# Patient Record
Sex: FEMALE | Race: BLACK OR AFRICAN AMERICAN | URBAN - METROPOLITAN AREA
[De-identification: names, ages, dates, MRNs, and addresses within clinical notes are randomized per-mention and may not be internally consistent; named-entity substitution may affect disease eponyms.]

---

## 2017-10-31 ENCOUNTER — FOLLOW UP ESTABLISHED (OUTPATIENT)
Dept: URBAN - METROPOLITAN AREA CLINIC 30 | Facility: CLINIC | Age: 67
End: 2017-10-31
Payer: MEDICARE

## 2017-10-31 DIAGNOSIS — H02.834 DERMATOCHALASIS OF LEFT UPPER LID: ICD-10-CM

## 2017-10-31 DIAGNOSIS — H02.831 DERMATOCHALASIS OF RIGHT UPPER LID: ICD-10-CM

## 2017-10-31 PROCEDURE — 92014 COMPRE OPH EXAM EST PT 1/>: CPT | Performed by: OPTOMETRIST

## 2017-10-31 PROCEDURE — 92250 FUNDUS PHOTOGRAPHY W/I&R: CPT | Performed by: OPTOMETRIST

## 2017-10-31 PROCEDURE — 92015 DETERMINE REFRACTIVE STATE: CPT | Performed by: OPTOMETRIST

## 2017-10-31 ASSESSMENT — VISUAL ACUITY
OD: 20/30
OS: 20/30

## 2017-10-31 ASSESSMENT — INTRAOCULAR PRESSURE
OD: 16
OS: 16

## 2018-10-30 ENCOUNTER — FOLLOW UP ESTABLISHED (OUTPATIENT)
Dept: URBAN - METROPOLITAN AREA CLINIC 30 | Facility: CLINIC | Age: 68
End: 2018-10-30
Payer: MEDICARE

## 2018-10-30 PROCEDURE — 92134 CPTRZ OPH DX IMG PST SGM RTA: CPT | Performed by: OPTOMETRIST

## 2018-10-30 PROCEDURE — 92014 COMPRE OPH EXAM EST PT 1/>: CPT | Performed by: OPTOMETRIST

## 2018-10-30 ASSESSMENT — INTRAOCULAR PRESSURE
OS: 13
OD: 14

## 2018-10-30 ASSESSMENT — VISUAL ACUITY
OS: 20/40
OD: 20/50

## 2018-10-30 ASSESSMENT — KERATOMETRY
OS: 44.79
OD: 44.83

## 2020-01-07 ENCOUNTER — FOLLOW UP ESTABLISHED (OUTPATIENT)
Dept: URBAN - METROPOLITAN AREA CLINIC 30 | Facility: CLINIC | Age: 70
End: 2020-01-07
Payer: MEDICARE

## 2020-01-07 PROCEDURE — 92014 COMPRE OPH EXAM EST PT 1/>: CPT | Performed by: OPTOMETRIST

## 2020-01-07 PROCEDURE — 92134 CPTRZ OPH DX IMG PST SGM RTA: CPT | Performed by: OPTOMETRIST

## 2020-01-07 PROCEDURE — 92015 DETERMINE REFRACTIVE STATE: CPT | Performed by: OPTOMETRIST

## 2020-01-07 PROCEDURE — 2022F DILAT RTA XM EVC RTNOPTHY: CPT | Performed by: OPTOMETRIST

## 2020-01-07 ASSESSMENT — KERATOMETRY
OD: 44.67
OS: 44.65

## 2020-01-07 ASSESSMENT — INTRAOCULAR PRESSURE
OD: 14
OS: 14

## 2020-01-07 ASSESSMENT — VISUAL ACUITY
OD: 20/40
OS: 20/30

## 2021-05-17 ENCOUNTER — OFFICE VISIT (OUTPATIENT)
Dept: URBAN - METROPOLITAN AREA CLINIC 30 | Facility: CLINIC | Age: 71
End: 2021-05-17
Payer: OTHER MISCELLANEOUS

## 2021-05-17 DIAGNOSIS — H04.123 DRY EYE SYNDROME OF BILATERAL LACRIMAL GLANDS: ICD-10-CM

## 2021-05-17 DIAGNOSIS — E11.9 TYPE 2 DIABETES MELLITUS WITHOUT COMPLICATIONS: Primary | ICD-10-CM

## 2021-05-17 DIAGNOSIS — Z79.84 LONG TERM (CURRENT) USE OF ORAL HYPOGLYCEMIC DRUGS: ICD-10-CM

## 2021-05-17 PROCEDURE — 92134 CPTRZ OPH DX IMG PST SGM RTA: CPT | Performed by: OPTOMETRIST

## 2021-05-17 PROCEDURE — 92014 COMPRE OPH EXAM EST PT 1/>: CPT | Performed by: OPTOMETRIST

## 2021-05-17 ASSESSMENT — KERATOMETRY
OS: 44.76
OD: 45.09

## 2021-05-17 ASSESSMENT — VISUAL ACUITY
OS: 20/30
OD: 20/30

## 2021-05-17 ASSESSMENT — INTRAOCULAR PRESSURE
OS: 13
OD: 18

## 2021-05-17 NOTE — IMPRESSION/PLAN
Impression: Tear film insufficiency of bilateral lacrimal glands Plan: Mild dryness noted today, improved from last visit. Using ATs prn. Increase use prn.

## 2021-05-17 NOTE — IMPRESSION/PLAN
Impression: Type 2 diabetes mellitus without complications Plan: No retinopathy on exam today. Patient educated on importance of BG control. Monitor annually.

## 2021-05-17 NOTE — IMPRESSION/PLAN
Impression: Combined forms of age-related cataract, bilateral Plan: Appear stable on exam today. Continue to monitor. New spec Rx today.

## 2021-08-31 ENCOUNTER — OFFICE VISIT (OUTPATIENT)
Dept: URBAN - METROPOLITAN AREA CLINIC 30 | Facility: CLINIC | Age: 71
End: 2021-08-31
Payer: OTHER MISCELLANEOUS

## 2021-08-31 PROCEDURE — 99213 OFFICE O/P EST LOW 20 MIN: CPT | Performed by: OPTOMETRIST

## 2021-08-31 ASSESSMENT — INTRAOCULAR PRESSURE
OS: 15
OD: 16

## 2021-08-31 NOTE — IMPRESSION/PLAN
Impression: Tear film insufficiency of bilateral lacrimal glands +MGD Plan: Patient notes tearing. Has been using Visine. d/c Visine and start ATs TID-QID OU. Rec WCP regularly as well-gave dry eye handout today. 
Call if NI.

## 2022-03-08 ENCOUNTER — OFFICE VISIT (OUTPATIENT)
Dept: URBAN - METROPOLITAN AREA CLINIC 40 | Facility: CLINIC | Age: 72
End: 2022-03-08
Payer: OTHER MISCELLANEOUS

## 2022-03-08 DIAGNOSIS — H43.813 VITREOUS DEGENERATION, BILATERAL: Primary | ICD-10-CM

## 2022-03-08 DIAGNOSIS — H25.813 COMBINED FORMS OF AGE-RELATED CATARACT, BILATERAL: ICD-10-CM

## 2022-03-08 PROCEDURE — 99204 OFFICE O/P NEW MOD 45 MIN: CPT | Performed by: OPTOMETRIST

## 2022-03-08 ASSESSMENT — KERATOMETRY
OD: 44.88
OS: 45.00

## 2022-03-08 ASSESSMENT — INTRAOCULAR PRESSURE
OS: 10
OD: 18

## 2022-03-08 NOTE — IMPRESSION/PLAN
Impression: Type 2 diabetes mellitus w/o complication: S55.0. Plan: Diabetes type II: no background retinopathy, no signs of neovascularization noted. Discussed ocular and systemic benefits of blood sugar control.

## 2022-05-10 ENCOUNTER — OFFICE VISIT (OUTPATIENT)
Dept: URBAN - METROPOLITAN AREA CLINIC 40 | Facility: CLINIC | Age: 72
End: 2022-05-10
Payer: OTHER MISCELLANEOUS

## 2022-05-10 DIAGNOSIS — H25.813 COMBINED FORMS OF AGE-RELATED CATARACT, BILATERAL: Primary | ICD-10-CM

## 2022-05-10 DIAGNOSIS — E11.9 TYPE 2 DIABETES MELLITUS W/O COMPLICATION: ICD-10-CM

## 2022-05-10 PROCEDURE — 99204 OFFICE O/P NEW MOD 45 MIN: CPT | Performed by: OPHTHALMOLOGY

## 2022-05-10 ASSESSMENT — VISUAL ACUITY
OD: 20/30
OS: 20/30

## 2022-05-10 ASSESSMENT — INTRAOCULAR PRESSURE
OD: 16
OS: 14

## 2022-05-10 NOTE — IMPRESSION/PLAN
Impression: Type 2 diabetes mellitus w/o complication: V83.4. Condition: new prob, no addtl w/u needed. Plan: Diabetes type II: no background retinopathy, no signs of neovascularization noted. Discussed ocular and systemic benefits of blood sugar control.

## 2022-05-10 NOTE — IMPRESSION/PLAN

## 2024-01-11 ENCOUNTER — OFFICE VISIT (OUTPATIENT)
Dept: URBAN - METROPOLITAN AREA CLINIC 30 | Facility: CLINIC | Age: 74
End: 2024-01-11
Payer: OTHER MISCELLANEOUS

## 2024-01-11 DIAGNOSIS — H43.813 VITREOUS DEGENERATION, BILATERAL: ICD-10-CM

## 2024-01-11 DIAGNOSIS — E11.9 TYPE 2 DIABETES MELLITUS WITHOUT COMPLICATIONS: Primary | ICD-10-CM

## 2024-01-11 DIAGNOSIS — Z96.1 PRESENCE OF INTRAOCULAR LENS: ICD-10-CM

## 2024-01-11 DIAGNOSIS — H04.123 DRY EYE SYNDROME OF BILATERAL LACRIMAL GLANDS: ICD-10-CM

## 2024-01-11 PROCEDURE — 92014 COMPRE OPH EXAM EST PT 1/>: CPT

## 2024-01-11 PROCEDURE — 92134 CPTRZ OPH DX IMG PST SGM RTA: CPT

## 2024-01-11 ASSESSMENT — VISUAL ACUITY
OD: 20/25
OS: 20/25

## 2024-01-11 ASSESSMENT — KERATOMETRY
OD: 44.86
OS: 44.88

## 2024-01-11 ASSESSMENT — INTRAOCULAR PRESSURE
OD: 15
OS: 16

## 2024-12-09 ENCOUNTER — OFFICE VISIT (OUTPATIENT)
Dept: URBAN - METROPOLITAN AREA CLINIC 30 | Facility: CLINIC | Age: 74
End: 2024-12-09
Payer: OTHER MISCELLANEOUS

## 2024-12-09 DIAGNOSIS — Z96.1 PRESENCE OF INTRAOCULAR LENS: ICD-10-CM

## 2024-12-09 DIAGNOSIS — H50.34 INTERMITTENT ALTERNATING EXOTROPIA: Primary | ICD-10-CM

## 2024-12-09 DIAGNOSIS — H52.4 PRESBYOPIA: ICD-10-CM

## 2024-12-09 DIAGNOSIS — H26.491 OTHER SECONDARY CATARACT, RIGHT EYE: ICD-10-CM

## 2024-12-09 PROCEDURE — 99213 OFFICE O/P EST LOW 20 MIN: CPT | Performed by: OPTOMETRIST

## 2024-12-09 ASSESSMENT — VISUAL ACUITY
OD: 20/20
OS: 20/30

## 2024-12-09 ASSESSMENT — KERATOMETRY
OS: 44.63
OD: 43.38

## 2024-12-09 ASSESSMENT — INTRAOCULAR PRESSURE
OD: 16
OS: 15